# Patient Record
Sex: MALE
[De-identification: names, ages, dates, MRNs, and addresses within clinical notes are randomized per-mention and may not be internally consistent; named-entity substitution may affect disease eponyms.]

---

## 2019-08-05 ENCOUNTER — APPOINTMENT (OUTPATIENT)
Dept: RADIOLOGY | Facility: CLINIC | Age: 34
End: 2019-08-05
Payer: OTHER GOVERNMENT

## 2019-08-05 PROBLEM — Z00.00 ENCOUNTER FOR PREVENTIVE HEALTH EXAMINATION: Status: ACTIVE | Noted: 2019-08-05

## 2019-08-05 PROCEDURE — 71046 X-RAY EXAM CHEST 2 VIEWS: CPT

## 2023-07-20 ENCOUNTER — APPOINTMENT (OUTPATIENT)
Dept: ORTHOPEDIC SURGERY | Facility: CLINIC | Age: 38
End: 2023-07-20
Payer: OTHER GOVERNMENT

## 2023-07-20 DIAGNOSIS — Z78.9 OTHER SPECIFIED HEALTH STATUS: ICD-10-CM

## 2023-07-20 DIAGNOSIS — M77.12 LATERAL EPICONDYLITIS, RIGHT ELBOW: ICD-10-CM

## 2023-07-20 DIAGNOSIS — M77.11 LATERAL EPICONDYLITIS, RIGHT ELBOW: ICD-10-CM

## 2023-07-20 DIAGNOSIS — F17.200 NICOTINE DEPENDENCE, UNSPECIFIED, UNCOMPLICATED: ICD-10-CM

## 2023-07-20 PROCEDURE — 99203 OFFICE O/P NEW LOW 30 MIN: CPT

## 2023-07-20 PROCEDURE — 73080 X-RAY EXAM OF ELBOW: CPT | Mod: 50

## 2023-07-20 NOTE — HISTORY OF PRESENT ILLNESS
[Sudden] : sudden [8] : 8 [3] : 3 [Dull/Aching] : dull/aching [Sharp] : sharp [Throbbing] : throbbing [Constant] : constant [Household chores] : household chores [Leisure] : leisure [Social interactions] : social interactions [Rest] : rest [Full time] : Work status: full time [de-identified] : Patient is here for bilat elbows. Patient states NKI. Patient states pain started 4/2023. Patient states pain is sharp and constant with ROM. Patient states he went to an urgent care and had a cortisone injection in 5/2023 in the right elbow with good relief for 2 weeks then pain returned.  [] : Post Surgical Visit: no [FreeTextEntry1] : Bilat elbows  [FreeTextEntry5] : Patient states NKI [FreeTextEntry3] : 4/2023 [de-identified] : Movement  [de-identified] : [employer]

## 2023-07-20 NOTE — ASSESSMENT
[FreeTextEntry1] : 38yo male with bilateral lateral epicondylitis R>L.  Will recommend nonsurgical management.\par \par -avoid wrist extension, continue to use elbow air band\par -Activities as tolerated\par -Rest, ice, compression, elevation, NSAIDs PRN for pain. \par -All questions answered\par -F/u 6 weeks\par \par The diagnosis was explained in detail. The potential non-surgical and surgical treatments were reviewed. The relative risks and benefits of each option were considered relative to the patient’s age, activity level, medical history, symptom severity and previously attempted treatments.\par \par The patient was advised to consult with their primary medical provider prior to initiation of any new medications to reduce the risk of adverse effects specific to their long-term home medications and medical history. The risk of gastrointestinal irritation and kidney injury specific to long-term NSAID use was discussed.

## 2023-07-20 NOTE — PHYSICAL EXAM
[NL (150)] : flexion 150 degrees [NL (0)] : extension 0 degrees [NL (90)] : supination 90 degrees [5___] : supination 5[unfilled]/5 [] : light touch intact [Bilateral] : elbow bilaterally [There are no fractures, subluxations or dislocations. No significant abnormalities are seen] : There are no fractures, subluxations or dislocations. No significant abnormalities are seen

## 2023-08-04 ENCOUNTER — APPOINTMENT (OUTPATIENT)
Dept: ORTHOPEDIC SURGERY | Facility: CLINIC | Age: 38
End: 2023-08-04